# Patient Record
Sex: FEMALE | Race: OTHER | HISPANIC OR LATINO | URBAN - METROPOLITAN AREA
[De-identification: names, ages, dates, MRNs, and addresses within clinical notes are randomized per-mention and may not be internally consistent; named-entity substitution may affect disease eponyms.]

---

## 2021-11-30 ENCOUNTER — EMERGENCY (EMERGENCY)
Facility: HOSPITAL | Age: 41
LOS: 1 days | Discharge: HOME | End: 2021-11-30
Attending: EMERGENCY MEDICINE
Payer: MEDICAID

## 2021-11-30 VITALS
OXYGEN SATURATION: 100 % | TEMPERATURE: 98 F | DIASTOLIC BLOOD PRESSURE: 73 MMHG | HEART RATE: 63 BPM | SYSTOLIC BLOOD PRESSURE: 110 MMHG | RESPIRATION RATE: 18 BRPM

## 2021-11-30 VITALS
SYSTOLIC BLOOD PRESSURE: 130 MMHG | WEIGHT: 151.9 LBS | HEIGHT: 61 IN | HEART RATE: 83 BPM | RESPIRATION RATE: 18 BRPM | TEMPERATURE: 98 F | DIASTOLIC BLOOD PRESSURE: 82 MMHG | OXYGEN SATURATION: 100 %

## 2021-11-30 PROCEDURE — 99283 EMERGENCY DEPT VISIT LOW MDM: CPT | Mod: 25

## 2021-11-30 PROCEDURE — 99284 EMERGENCY DEPT VISIT MOD MDM: CPT

## 2021-11-30 PROCEDURE — 96372 THER/PROPH/DIAG INJ SC/IM: CPT

## 2021-11-30 RX ORDER — KETOROLAC TROMETHAMINE 30 MG/ML
30 SYRINGE (ML) INJECTION ONCE
Refills: 0 | Status: DISCONTINUED | OUTPATIENT
Start: 2021-11-30 | End: 2021-11-30

## 2021-11-30 RX ORDER — LIDOCAINE 4 G/100G
1 CREAM TOPICAL ONCE
Refills: 0 | Status: COMPLETED | OUTPATIENT
Start: 2021-11-30 | End: 2021-11-30

## 2021-11-30 RX ORDER — METHOCARBAMOL 500 MG/1
750 TABLET, FILM COATED ORAL ONCE
Refills: 0 | Status: COMPLETED | OUTPATIENT
Start: 2021-11-30 | End: 2021-11-30

## 2021-11-30 RX ORDER — METHOCARBAMOL 500 MG/1
1 TABLET, FILM COATED ORAL
Qty: 12 | Refills: 0
Start: 2021-11-30

## 2021-11-30 RX ADMIN — Medication 30 MILLIGRAM(S): at 11:00

## 2021-11-30 RX ADMIN — Medication 30 MILLIGRAM(S): at 10:38

## 2021-11-30 RX ADMIN — METHOCARBAMOL 750 MILLIGRAM(S): 500 TABLET, FILM COATED ORAL at 10:38

## 2021-11-30 RX ADMIN — LIDOCAINE 1 PATCH: 4 CREAM TOPICAL at 10:38

## 2021-11-30 NOTE — ED PROVIDER NOTE - PATIENT PORTAL LINK FT
You can access the FollowMyHealth Patient Portal offered by [facility] by registering at the following website: [email or web address]. By joining [facility]’s FollowMyHealth portal, you will also be able to view your health information using other applications (apps) compatible with our system.

## 2021-11-30 NOTE — ED PROVIDER NOTE - OBJECTIVE STATEMENT
[place] [id number]  41yoF with h/o gastritis, presents with pain to R low back, buttock, radiating down leg, sharp in character, constant, worsen with position changes. 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 Denies fall or other trauma, urinary symptoms, fever, v/d, weakness or numbness of extrems, and all other symptoms.

## 2021-11-30 NOTE — ED PROVIDER NOTE - NSFOLLOWUPINSTRUCTIONS_ED_ALL_ED_FT
Sandra un seguimiento con mata médico de atención primaria en 1-2 días.  Utilice ibuprofeno o acetaminofén según sea necesario para el dolor y metocarbamol si no es suficiente.  Regrese al departamento de emergencias si tiene un dolor que empeora, síntomas urinarios, debilidad o entumecimiento de las piernas, vómitos, fiebre o cualquier otro síntoma.      Dolor alpa de la parte inferior de la espalda    LO QUE NECESITA SABER:    El dolor alpa de la región lumbar de la espalda es brennon molestia repentina que dura hasta 6 semanas y que dificulta la actividad.    INSTRUCCIONES SOBRE EL [facility]ITAL[facility]:    Regrese a la suzy de emergencias si:  •Usted tiene dolor intenso.  •Usted repentinamente tiene rigidez o siente pesadez en ambos glúteos hacia abajo de ambas piernas.  •Usted tiene entumecimiento o debilidad en brennon pierna o dolor en ambas piernas.  •Usted tiene entumecimiento en el área genital o en la región lumbar.  •Usted no puede controlar mata orina ni bhavna deposiciones intestinales.    Llame a mata médico si:  •Tiene fiebre.  •Usted tiene un dolor por la noche o cuando descansa.  •Mata dolor no mejora con el tratamiento.  •Usted tiene dolor que empeora cuando tose o estornuda.  •Usted siente un estallido o chasquido repentino en mata espalda.  •Usted tiene preguntas o inquietudes acerca de mata condición o cuidado.    Medicamentos:Es posible que usted necesite alguno de los siguientes:  •Los STEPHANIE,robert el ibuprofeno, ayudan a disminuir la inflamación, el dolor y la fiebre. Lalo medicamento está disponible con o sin brennon receta médica. Los STEPHANIE pueden causar sangrado estomacal o problemas renales en ciertas personas. Si usted heriberto un medicamento anticoagulante, siempre pregúntele a mata médico si los STEPHANIE son seguros para usted. Siempre jolene la etiqueta de lalo medicamento y siga las instrucciones.  •Acetaminofénalivia el dolor y baja la fiebre. Está disponible sin receta médica. Pregunte la cantidad y la frecuencia con que debe tomarlos. Siga las indicaciones. Jolene las etiquetas de todos los demás medicamentos que esté usando para saber si también contienen acetaminofén, o pregunte a mata médico o farmacéutico. El acetaminofén puede causar daño en el hígado cuando no se heriberto de forma correcta. No use más de 4 gramos (4000 miligramos) en total de acetaminofeno en un día  •Relajantes muscularesdisminuyen el dolor y relajan los músculos de la parte inferior de la columna.  •Puede administrarsepodrían administrarse. Pregunte al médico cómo debe rusty lalo medicamento de forma almanzar. Algunos medicamentos recetados para el dolor contienen acetaminofén. No tome otros medicamentos que contengan acetaminofén sin consultarlo con mata médico. Demasiado acetaminofeno puede causar daño al hígado. Los medicamentos recetados para el dolor podrían causar estreñimiento. Pregunte a mata médico robert prevenir o tratar estreñimiento.    Cuidados personales:  •Manténgase activolo más que pueda sin causar más dolor. El reposo en cama puede empeorar mata dolor de espalda. Comience con ejercicios ligeros, robert caminar. Evite levantar objetos hasta que ya no tenga dolor. Solicite más información acerca de las actividades físicas o plan de ejercicios que son los adecuados para usted.  •Aplique caloren la espalda de 20 a 30 minutos cada 2 horas por los días que le indiquen. El calor ayuda a disminuir el dolor y los espasmos musculares. Alterne entre el calor y el hielo.  •Aplique hieloen la espalda de 15 a 20 minutos cada hora o robert se le indique. Use brennon compresa de hielo o ponga hielo triturado en brennon bolsa de plástico. Cúbralo con brennon toalla antes de aplicarlo sobre mata piel. El hielo ayuda a evitar daño al tejido y a disminuir la inflamación y el dolor.    Prevenir el dolor alpa de la parte inferior de la espalda:  •Use la mecánica corporal adecuada.?Flexione la cadera y las rodillas cuando vaya a levantar un objeto. No doble la cintura. Utilice los músculos de las piernas mientras levanta mata carga. No use mata espalda. Mantenga el objeto cerca de mata pecho mientras lo levanta. No se tuerza, ni levante cualquier cosa por encima de mata cintura.  ?Cambie mata posición frecuentemente cuando pase mucho tiempo de pie. Descanse un pie sobre brennon caja pequeña o un reposapiés e intercambie con el otro pie frecuentemente.  ?No permanezca sentado por lapsos de tiempo prolongados. Cuando sea necesario hacerlo, siéntese en brennon silla de respaldo recto con los pies apoyados en el suelo. Nunca alcance, jale ni empuje mientras se encuentra sentando.  •Sandra ejercicios que fortalezcan bhavna músculos de la espalda.Entre en calor antes de hacer ejercicio. Consulte con mata médico sobre el mejor plan de ejercicios para usted.  •Mantenga un peso saludable.Pregúntele a mata médico cuál es el peso ideal para usted. Pídale que lo ayude a crear un plan para bajar de peso si tiene sobrepeso.    Acuda a la consulta de control con mata médico según las indicaciones:Regrese a brennon ibis de seguimiento si usted aún tiene dolor después de 1 a 3 semanas de tratamiento. Paul vez necesite acudir con un ortopedista si mata dolor de espalda dura más de 12 semanas. Anote bhavna preguntas para que se acuerde de hacerlas lavon bhavna visitas.

## 2021-11-30 NOTE — ED PROVIDER NOTE - PHYSICAL EXAMINATION
Afebrile, hemodynamically stable, saturating well  NAD, well appearing, sitting comfortably in bed, no WOB, speaking full sentences  Head NCAT  EOMI grossly, anicteric  MMM  Breathing comfortably on RA  Abd soft, NT, ND, no rebound or guarding  No back deformity/abnormality, no CVAT  +SLR, motor 5/5 and sens symm in b/l LE's  AAO, CN's 3-12 grossly intact  SIERRA spontaneously, no leg cyanosis or edema  Skin warm, well perfused, no rashes or hives

## 2021-11-30 NOTE — ED ADULT NURSE NOTE - OBJECTIVE STATEMENT
Pt c/o Left back pain radiating to the left buttock and leg. No acute distress noted, denies chest pain, no shortness of breath indicated. Pt c/o right lower back pain radiating to the leg. No acute distress noted, denies chest pain, no shortness of breath indicated.

## 2021-11-30 NOTE — ED ADULT NURSE NOTE - NSIMPLEMENTINTERV_GEN_ALL_ED
Implemented All Universal Safety Interventions:  [place] to call system. Call bell, personal items and telephone within reach. Instruct patient to call for assistance. Room bathroom lighting operational. Non-slip footwear when patient is off stretcher. Physically safe environment: no spills, clutter or unnecessary equipment. Stretcher in lowest position, wheels locked, appropriate side rails in place.

## 2021-11-30 NOTE — ED PROVIDER NOTE - CLINICAL SUMMARY MEDICAL DECISION MAKING FREE TEXT BOX
No trauma. No e/o cord compression. No abd pain or urinary symptoms. Ambulating independently. Character c/w herniated disc. Patient is well appearing, NAD, afebrile, hemodynamically stable. Given Toradol, robaxin, lidocaine patch with improvement. Discharged with instructions in further symptomatic care, return precautions, and need for PMD f/u which she states she has established.

## 2023-03-27 ENCOUNTER — EMERGENCY (EMERGENCY)
Facility: HOSPITAL | Age: 43
LOS: 1 days | Discharge: HOME | End: 2023-03-27
Attending: EMERGENCY MEDICINE
Payer: MEDICAID

## 2023-03-27 VITALS
RESPIRATION RATE: 18 BRPM | SYSTOLIC BLOOD PRESSURE: 125 MMHG | HEART RATE: 100 BPM | TEMPERATURE: 100 F | OXYGEN SATURATION: 100 % | DIASTOLIC BLOOD PRESSURE: 81 MMHG | HEIGHT: 60 IN | WEIGHT: 160.06 LBS

## 2023-03-27 VITALS
DIASTOLIC BLOOD PRESSURE: 66 MMHG | HEART RATE: 77 BPM | OXYGEN SATURATION: 97 % | TEMPERATURE: 98 F | SYSTOLIC BLOOD PRESSURE: 104 MMHG | RESPIRATION RATE: 16 BRPM

## 2023-03-27 LAB
ALBUMIN SERPL ELPH-MCNC: 3.4 G/DL — LOW (ref 3.5–5)
ALP SERPL-CCNC: 102 U/L — SIGNIFICANT CHANGE UP (ref 40–120)
ALT FLD-CCNC: 45 U/L DA — SIGNIFICANT CHANGE UP (ref 10–60)
ANION GAP SERPL CALC-SCNC: 6 MMOL/L — SIGNIFICANT CHANGE UP (ref 5–17)
AST SERPL-CCNC: 29 U/L — SIGNIFICANT CHANGE UP (ref 10–40)
BASOPHILS # BLD AUTO: 0.02 K/UL — SIGNIFICANT CHANGE UP (ref 0–0.2)
BASOPHILS NFR BLD AUTO: 0.2 % — SIGNIFICANT CHANGE UP (ref 0–2)
BILIRUB SERPL-MCNC: 0.7 MG/DL — SIGNIFICANT CHANGE UP (ref 0.2–1.2)
BUN SERPL-MCNC: 5 MG/DL — LOW (ref 7–18)
CALCIUM SERPL-MCNC: 9.1 MG/DL — SIGNIFICANT CHANGE UP (ref 8.4–10.5)
CHLORIDE SERPL-SCNC: 109 MMOL/L — HIGH (ref 96–108)
CO2 SERPL-SCNC: 23 MMOL/L — SIGNIFICANT CHANGE UP (ref 22–31)
CREAT SERPL-MCNC: 0.62 MG/DL — SIGNIFICANT CHANGE UP (ref 0.5–1.3)
EGFR: 115 ML/MIN/1.73M2 — SIGNIFICANT CHANGE UP
EOSINOPHIL # BLD AUTO: 0.01 K/UL — SIGNIFICANT CHANGE UP (ref 0–0.5)
EOSINOPHIL NFR BLD AUTO: 0.1 % — SIGNIFICANT CHANGE UP (ref 0–6)
GLUCOSE SERPL-MCNC: 114 MG/DL — HIGH (ref 70–99)
HCG SERPL-ACNC: <1 MIU/ML — SIGNIFICANT CHANGE UP
HCT VFR BLD CALC: 36 % — SIGNIFICANT CHANGE UP (ref 34.5–45)
HGB BLD-MCNC: 12.1 G/DL — SIGNIFICANT CHANGE UP (ref 11.5–15.5)
IMM GRANULOCYTES NFR BLD AUTO: 0.4 % — SIGNIFICANT CHANGE UP (ref 0–0.9)
LYMPHOCYTES # BLD AUTO: 1.28 K/UL — SIGNIFICANT CHANGE UP (ref 1–3.3)
LYMPHOCYTES # BLD AUTO: 10.8 % — LOW (ref 13–44)
MCHC RBC-ENTMCNC: 29.6 PG — SIGNIFICANT CHANGE UP (ref 27–34)
MCHC RBC-ENTMCNC: 33.6 GM/DL — SIGNIFICANT CHANGE UP (ref 32–36)
MCV RBC AUTO: 88 FL — SIGNIFICANT CHANGE UP (ref 80–100)
MONOCYTES # BLD AUTO: 0.53 K/UL — SIGNIFICANT CHANGE UP (ref 0–0.9)
MONOCYTES NFR BLD AUTO: 4.5 % — SIGNIFICANT CHANGE UP (ref 2–14)
NEUTROPHILS # BLD AUTO: 9.92 K/UL — HIGH (ref 1.8–7.4)
NEUTROPHILS NFR BLD AUTO: 84 % — HIGH (ref 43–77)
NRBC # BLD: 0 /100 WBCS — SIGNIFICANT CHANGE UP (ref 0–0)
PLATELET # BLD AUTO: 196 K/UL — SIGNIFICANT CHANGE UP (ref 150–400)
POTASSIUM SERPL-MCNC: 3.9 MMOL/L — SIGNIFICANT CHANGE UP (ref 3.5–5.3)
POTASSIUM SERPL-SCNC: 3.9 MMOL/L — SIGNIFICANT CHANGE UP (ref 3.5–5.3)
PROT SERPL-MCNC: 7.3 G/DL — SIGNIFICANT CHANGE UP (ref 6–8.3)
RBC # BLD: 4.09 M/UL — SIGNIFICANT CHANGE UP (ref 3.8–5.2)
RBC # FLD: 15.1 % — HIGH (ref 10.3–14.5)
SODIUM SERPL-SCNC: 138 MMOL/L — SIGNIFICANT CHANGE UP (ref 135–145)
WBC # BLD: 11.81 K/UL — HIGH (ref 3.8–10.5)
WBC # FLD AUTO: 11.81 K/UL — HIGH (ref 3.8–10.5)

## 2023-03-27 PROCEDURE — 96375 TX/PRO/DX INJ NEW DRUG ADDON: CPT | Mod: XU

## 2023-03-27 PROCEDURE — 70491 CT SOFT TISSUE NECK W/DYE: CPT | Mod: 26,MC

## 2023-03-27 PROCEDURE — 84702 CHORIONIC GONADOTROPIN TEST: CPT

## 2023-03-27 PROCEDURE — 70491 CT SOFT TISSUE NECK W/DYE: CPT | Mod: MC

## 2023-03-27 PROCEDURE — 80053 COMPREHEN METABOLIC PANEL: CPT

## 2023-03-27 PROCEDURE — 99284 EMERGENCY DEPT VISIT MOD MDM: CPT

## 2023-03-27 PROCEDURE — 85025 COMPLETE CBC W/AUTO DIFF WBC: CPT

## 2023-03-27 PROCEDURE — 96374 THER/PROPH/DIAG INJ IV PUSH: CPT | Mod: XU

## 2023-03-27 PROCEDURE — 36415 COLL VENOUS BLD VENIPUNCTURE: CPT

## 2023-03-27 PROCEDURE — 99284 EMERGENCY DEPT VISIT MOD MDM: CPT | Mod: 25

## 2023-03-27 RX ORDER — DEXAMETHASONE 0.5 MG/5ML
6 ELIXIR ORAL ONCE
Refills: 0 | Status: COMPLETED | OUTPATIENT
Start: 2023-03-27 | End: 2023-03-27

## 2023-03-27 RX ORDER — KETOROLAC TROMETHAMINE 30 MG/ML
30 SYRINGE (ML) INJECTION ONCE
Refills: 0 | Status: DISCONTINUED | OUTPATIENT
Start: 2023-03-27 | End: 2023-03-27

## 2023-03-27 RX ADMIN — Medication 6 MILLIGRAM(S): at 09:14

## 2023-03-27 RX ADMIN — Medication 30 MILLIGRAM(S): at 00:00

## 2023-03-27 RX ADMIN — Medication 30 MILLIGRAM(S): at 09:14

## 2023-03-27 NOTE — ED ADULT NURSE NOTE - OBJECTIVE STATEMENT
Patient came to the ED a/o x 3 ambulates c/o throat pain x 2 days. + throat itching, denies any SOB/ difficulty swallowing.

## 2023-03-27 NOTE — ED PROVIDER NOTE - CLINICAL SUMMARY MEDICAL DECISION MAKING FREE TEXT BOX
42-year-old female presents to ED with throat pain for the past 2 days.  Patient afebrile here ED tonsillar exudates appreciated.  Concern for possible retropharyngeal abscess or peritonsillar abscess.  Labs CT neck Toradol Decadron for supportive care and reassess

## 2023-03-27 NOTE — ED PROVIDER NOTE - OBJECTIVE STATEMENT
42-year-old female [place]ian-speaking  #[id number].  Patient with a past medical history of hyperlipidemia.  Patient presents to the ED with throat pain and difficulty swallowing for the last 2 days patient denies fever no cough no chest pain or shortness of breath.  Patient is having difficulty swallowing secondary to throat pain.  No change in voice.  Patient denies similar symptoms previously

## 2023-03-27 NOTE — ED PROVIDER NOTE - PROGRESS NOTE DETAILS
CT shows Asymmetric enlargement right tonsillar pillar with focal submucosal   enhancement and surrounding vasogenic edema although no well-defined   abscess collection.  Pt feels better with toradol and decardon.  Will dc with abx.

## 2023-03-27 NOTE — ED PROVIDER NOTE - PATIENT PORTAL LINK FT
You can access the FollowMyHealth Patient Portal offered by [facility] by registering at the following website: [email or web address]. By joining [facility]’s FollowMyHealth portal, you will also be able to view your health information using other applications (apps) compatible with our system.

## 2023-12-11 ENCOUNTER — EMERGENCY (EMERGENCY)
Facility: HOSPITAL | Age: 43
LOS: 1 days | Discharge: HOME | End: 2023-12-11
Attending: EMERGENCY MEDICINE
Payer: MEDICAID

## 2023-12-11 VITALS
HEART RATE: 67 BPM | TEMPERATURE: 98 F | DIASTOLIC BLOOD PRESSURE: 72 MMHG | RESPIRATION RATE: 16 BRPM | SYSTOLIC BLOOD PRESSURE: 126 MMHG | WEIGHT: 160.06 LBS | HEIGHT: 60 IN | OXYGEN SATURATION: 98 %

## 2023-12-11 PROBLEM — E78.5 HYPERLIPIDEMIA, UNSPECIFIED: Chronic | Status: ACTIVE | Noted: 2023-03-27

## 2023-12-11 LAB
HCG UR QL: NEGATIVE — SIGNIFICANT CHANGE UP
HCG UR QL: NEGATIVE — SIGNIFICANT CHANGE UP

## 2023-12-11 PROCEDURE — 99284 EMERGENCY DEPT VISIT MOD MDM: CPT

## 2023-12-11 PROCEDURE — 81025 URINE PREGNANCY TEST: CPT

## 2023-12-11 PROCEDURE — 96372 THER/PROPH/DIAG INJ SC/IM: CPT

## 2023-12-11 PROCEDURE — 73502 X-RAY EXAM HIP UNI 2-3 VIEWS: CPT

## 2023-12-11 PROCEDURE — 99283 EMERGENCY DEPT VISIT LOW MDM: CPT | Mod: 25

## 2023-12-11 PROCEDURE — 73502 X-RAY EXAM HIP UNI 2-3 VIEWS: CPT | Mod: 26,RT

## 2023-12-11 RX ORDER — CYCLOBENZAPRINE HYDROCHLORIDE 10 MG/1
1 TABLET, FILM COATED ORAL
Qty: 21 | Refills: 0
Start: 2023-12-11 | End: 2023-12-17

## 2023-12-11 RX ORDER — IBUPROFEN 200 MG
1 TABLET ORAL
Qty: 21 | Refills: 0
Start: 2023-12-11 | End: 2023-12-17

## 2023-12-11 RX ORDER — KETOROLAC TROMETHAMINE 30 MG/ML
30 SYRINGE (ML) INJECTION ONCE
Refills: 0 | Status: DISCONTINUED | OUTPATIENT
Start: 2023-12-11 | End: 2023-12-11

## 2023-12-11 RX ORDER — DIAZEPAM 5 MG
5 TABLET ORAL ONCE
Refills: 0 | Status: DISCONTINUED | OUTPATIENT
Start: 2023-12-11 | End: 2023-12-11

## 2023-12-11 RX ADMIN — Medication 5 MILLIGRAM(S): at 17:37

## 2023-12-11 RX ADMIN — Medication 30 MILLIGRAM(S): at 17:37

## 2023-12-11 RX ADMIN — Medication 40 MILLIGRAM(S): at 19:01

## 2023-12-11 RX ADMIN — Medication 30 MILLIGRAM(S): at 18:07

## 2023-12-11 NOTE — ED PROVIDER NOTE - CLINICAL SUMMARY MEDICAL DECISION MAKING FREE TEXT BOX
43 year old female presents with right lateral hip pain. Symptoms may suggest hip bursitis, less likely sciatica, among other causes. Plan to provide NSAIDs, muscle relaxants, do X-Ray of hip, and reassess.

## 2023-12-11 NOTE — ED ADULT NURSE NOTE - NSFALLUNIVINTERV_ED_ALL_ED
Bed/Stretcher in lowest position, wheels locked, appropriate side rails in place/Call bell, personal items and telephone in reach/Instruct patient to call for assistance before getting out of bed/chair/stretcher/Non-slip footwear applied when patient is off stretcher/[place] to call system/Physically safe environment - no spills, clutter or unnecessary equipment/Purposeful proactive rounding/Room/bathroom lighting operational, light cord in reach Bed/Stretcher in lowest position, wheels locked, appropriate side rails in place/Call bell, personal items and telephone in reach/Instruct patient to call for assistance before getting out of bed/chair/stretcher/Non-slip footwear applied when patient is off stretcher/[place] to call system/Physically safe environment - no spills, clutter or unnecessary equipment/Purposeful proactive rounding/Room/bathroom lighting operational, light cord in reach

## 2023-12-11 NOTE — ED PROVIDER NOTE - NSFOLLOWUPINSTRUCTIONS_ED_ALL_ED_FT
Ciática  Sciatica  Back view of a person showing a normal leg and a leg affected by the pain of sciatica.  La ciática es el dolor, entumecimiento, debilidad u hormigueo a lo lyn del nervio ciático. El nervio ciático comienza en la parte inferior de la espalda y desciende por la parte posterior de cada pierna. Controla los músculos en la parte inferior de las piernas y en la parte posterior de las rodillas. También proporciona sensibilidad a la parte posterior de los muslos, la parte inferior de las piernas y la planta de los pies. La ciática es un síntoma de otra afección que ejerce presión o “pellizca” el nervio ciático.    Con mayor frecuencia, la ciática afecta a un solo lado del cuerpo. Suele desaparecer por sí nallely o con tratamiento. En algunos casos, la ciática puede volver (ser recurrente).    ¿Cuáles son las causas?  Esta afección es causada por brennon presión sobre el nervio ciático o “pellizco” del nervio ciático. [place] puede ser el resultado de:  Un disco que sobresale demasiado entre los huesos de la columna vertebral (hernia de disco).  Cambios en los discos vertebrales relacionados con la edad.  Un trastorno doloroso que afecta un músculo de las nalgas.  Un crecimiento óseo adicional cerca del nervio ciático.  Brennon rotura (fractura) de la pelvis.  Embarazo.  Tumor. [place] es poco frecuente.  ¿Qué incrementa el riesgo?  Los siguientes factores pueden hacer que sea más propenso a desarrollar esta afección:  Practicar deportes que ponen presión o tensión sobre la columna vertebral.  Tener poca fuerza y flexibilidad.  Antecedentes de cirugía o lesión en la espalda.  Estar sentado jarad largos períodos de tiempo.  Realizar actividades que requieren agacharse o levantar objetos en forma repetida.  Obesidad.  ¿Cuáles son los signos o síntomas?  Los síntomas pueden variar de leves a muy graves. Pueden incluir los siguientes:  Cualquiera de los siguientes problemas en la parte inferior de la espalda, las piernas, la cadera o las nalgas:  Hormigueo leve, adormecimiento o dolor sordo.  Sensación de ardor.  Dolor alpa.  Adormecimiento de la parte posterior de la pantorrilla o la planta del pie.  Debilidad en las piernas.  Dolor de espalda intenso que dificulta el movimiento.  Los síntomas podrían empeorar al toser, estornudar o reírse, o cuando se está sentado o de pie jarad períodos prolongados.    ¿Cómo se diagnostica?  Esta afección se puede diagnosticar en función de lo siguiente:  Los síntomas y los antecedentes médicos.  Un examen físico.  Pruebas de frankie.  Pruebas de diagnóstico por imágenes, por ejemplo:  Radiografías.  Brennon resonancia magnética (RM).  Brennon exploración por tomografía computarizada (TC).  ¿Cómo se trata?  En muchos casos, esta afección mejora por sí nallely, sin tratamiento. Sin embargo, el tratamiento puede incluir:  Reducción o modificación la actividad física.  Hacer ejercicio, incluido el fortalecimiento y la elongación.  Aplicación de calor o hielo en la glenis afectada.  Medicamentos para lo siguiente:  Aliviar el dolor y la inflamación.  Relajan los músculos.  Medicamentos inyectables que ayudan a aliviar el dolor y la inflamación (corticoesteroides) alrededor del nervio ciático.  Cirugía.  Siga estas instrucciones en mata casa:  Medicamentos    Use los medicamentos de venta kaiser y los recetados solamente robert se lo haya indicado el médico.  Pregúntele al médico si el medicamento recetado le impide conducir o usar maquinaria pesada.  Control del dolor    Bag of ice on a towel on the skin.  A heating pad for use on the affected area.  Si se lo indican, aplique hielo sobre la glenis afectada. Para hacer esto:  Ponga el hielo en brennon bolsa plástica.  Coloque brennon toalla entre la piel y la bolsa.  Aplique el hielo jarad 20 minutos, 2 a 3 veces por día.  Si la piel se le pone de color maxwell brillante, retire el hielo de inmediato para evitar daños en la piel. El riesgo de daño en la piel es mayor si no puede sentir dolor, calor o frío.  Si se lo indican, aplique calor en la glenis afectada con la frecuencia que le haya indicado el médico. Use la luis angel de calor que el médico le recomiende, robert brennon compresa de calor húmedo o brennon almohadilla térmica.  Coloque brennon toalla entre la piel y la luis angel de calor.  Aplique calor jarad 20 a 30 minutos.  Si la piel se le pone de color maxwell brillante, retire el calor de inmediato para evitar quemaduras. El riesgo de quemaduras es mayor si no puede sentir el dolor, el calor o el frío.  Actividad    A comparison showing the right and wrong way to lift a heavy object.  Retome bhavna actividades normales robert se lo haya indicado el médico. Pregúntele al médico qué actividades son seguras para usted.  Evite las actividades que empeoran los síntomas.  Jarad el día, descanse jarad lapsos breves.  Cuando descanse jarad períodos más largos, incorpore alguna actividad suave o ejercicios de elongación entre los períodos de descanso. [place] ayudará a evitar la rigidez y el dolor.  Evite estar sentado jarad largos períodos de tiempo sin moverse. Levántese y muévase al menos brennon vez cada hora.  Sandra ejercicio y elongue con regularidad, robert se lo haya indicado el médico.  No levante objetos que pesen más de 10 libras (4.5 kg) hasta que el médico le diga que es seguro. Aunque no tenga síntomas, evite levantar objetos pesados, en especial en forma repetida.  Siempre use las técnicas de levantamiento correctas para levantar objetos, entre ellas:  Flexionar las rodillas.  Mantener la carga cerca del cuerpo.  No torcerse.  Instrucciones generales    Mantenga un peso saludable. El exceso de peso ejerce tensión adicional sobre la espalda.  Use calzado con buen apoyo y cómodo. Evite usar tacones.  Evite dormir sobre un colchón que sea demasiado blando o demasiado pilo. Un colchón que ofrezca un apoyo suficientemente firme para mata espalda al dormir puede ayudar a aliviar el dolor.  Comuníquese con un médico si:  El dolor no se adonay con los medicamentos.  El dolor no mejora o empeora.  Los síntomas rodriguez más de 4 semanas.  Baja de peso sin causa aparente.  Solicite ayuda de inmediato si:  No puede controlar cuándo orinar o defecar (incontinencia).  Tiene lo siguiente:  Debilidad que empeora en la parte inferior de la espalda, la pelvis, las nalgas o las piernas.  Enrojecimiento o inflamación en la espalda.  Sensación de ardor al orinar.  Resumen  La ciática es dolor, entumecimiento, debilidad u hormigueo a lo lyn del trayecto del nervio ciático, que puede incluir la parte inferior de la espalda, las piernas, las caderas y las nalgas.  Esta afección es causada por brennon presión sobre el nervio ciático o “pellizco” del nervio ciático.  El tratamiento a menudo incluye reposo, ejercicios, medicamentos y aplicar hielo o calor.  Esta información no tiene robert fin reemplazar el consejo del médico. Asegúrese de hacerle al médico cualquier pregunta que tenga.    Document Revised: 04/25/2023 Document Reviewed: 04/25/2023  [employer] Patient Education © 2023 [employer].  [employer] logo  Terms and Conditions  Privacy Policy  Editorial Policy  All content on this site: Copyright © 2023 [employer], its licensors, and contributors. All rights are reserved, including those for text and data mining, AI training, and similar technologies. For all open access content, the Creative Commons licensing terms apply.  Cookies are used by this site. To decline or learn more, visit our Cookies page. Ciática  Sciatica  Back view of a person showing a normal leg and a leg affected by the pain of sciatica.  La ciática es el dolor, entumecimiento, debilidad u hormigueo a lo lyn del nervio ciático. El nervio ciático comienza en la parte inferior de la espalda y desciende por la parte posterior de cada pierna. Controla los músculos en la parte inferior de las piernas y en la parte posterior de las rodillas. También proporciona sensibilidad a la parte posterior de los muslos, la parte inferior de las piernas y la planta de los pies. La ciática es un síntoma de otra afección que ejerce presión o “pellizca” el nervio ciático.    Con mayor frecuencia, la ciática afecta a un solo lado del cuerpo. Suele desaparecer por sí nallely o con tratamiento. En algunos casos, la ciática puede volver (ser recurrente).    ¿Cuáles son las causas?  Esta afección es causada por brennon presión sobre el nervio ciático o “pellizco” del nervio ciático. [place] puede ser el resultado de:  Un disco que sobresale demasiado entre los huesos de la columna vertebral (hernia de disco).  Cambios en los discos vertebrales relacionados con la edad.  Un trastorno doloroso que afecta un músculo de las nalgas.  Un crecimiento óseo adicional cerca del nervio ciático.  Brennon rotura (fractura) de la pelvis.  Embarazo.  Tumor. [place] es poco frecuente.  ¿Qué incrementa el riesgo?  Los siguientes factores pueden hacer que sea más propenso a desarrollar esta afección:  Practicar deportes que ponen presión o tensión sobre la columna vertebral.  Tener poca fuerza y flexibilidad.  Antecedentes de cirugía o lesión en la espalda.  Estar sentado jarad largos períodos de tiempo.  Realizar actividades que requieren agacharse o levantar objetos en forma repetida.  Obesidad.  ¿Cuáles son los signos o síntomas?  Los síntomas pueden variar de leves a muy graves. Pueden incluir los siguientes:  Cualquiera de los siguientes problemas en la parte inferior de la espalda, las piernas, la cadera o las nalgas:  Hormigueo leve, adormecimiento o dolor sordo.  Sensación de ardor.  Dolor alpa.  Adormecimiento de la parte posterior de la pantorrilla o la planta del pie.  Debilidad en las piernas.  Dolor de espalda intenso que dificulta el movimiento.  Los síntomas podrían empeorar al toser, estornudar o reírse, o cuando se está sentado o de pie jarad períodos prolongados.    ¿Cómo se diagnostica?  Esta afección se puede diagnosticar en función de lo siguiente:  Los síntomas y los antecedentes médicos.  Un examen físico.  Pruebas de frankie.  Pruebas de diagnóstico por imágenes, por ejemplo:  Radiografías.  Brennon resonancia magnética (RM).  Brennon exploración por tomografía computarizada (TC).  ¿Cómo se trata?  En muchos casos, esta afección mejora por sí nallely, sin tratamiento. Sin embargo, el tratamiento puede incluir:  Reducción o modificación la actividad física.  Hacer ejercicio, incluido el fortalecimiento y la elongación.  Aplicación de calor o hielo en la glenis afectada.  Medicamentos para lo siguiente:  Aliviar el dolor y la inflamación.  Relajan los músculos.  Medicamentos inyectables que ayudan a aliviar el dolor y la inflamación (corticoesteroides) alrededor del nervio ciático.  Cirugía.  Siga estas instrucciones en mata casa:  Medicamentos    Use los medicamentos de venta kaiser y los recetados solamente robert se lo haya indicado el médico.  Pregúntele al médico si el medicamento recetado le impide conducir o usar maquinaria pesada.  Control del dolor    Bag of ice on a towel on the skin.  A heating pad for use on the affected area.  Si se lo indican, aplique hielo sobre la glenis afectada. Para hacer esto:  Ponga el hielo en brennon bolsa plástica.  Coloque brennon toalla entre la piel y la bolsa.  Aplique el hielo jarad 20 minutos, 2 a 3 veces por día.  Si la piel se le pone de color maxwell brillante, retire el hielo de inmediato para evitar daños en la piel. El riesgo de daño en la piel es mayor si no puede sentir dolor, calor o frío.  Si se lo indican, aplique calor en la glenis afectada con la frecuencia que le haya indicado el médico. Use la luis angel de calor que el médico le recomiende, robert brennon compresa de calor húmedo o brennon almohadilla térmica.  Coloque brennon toalla entre la piel y la luis angel de calor.  Aplique calor jarad 20 a 30 minutos.  Si la piel se le pone de color maxwell brillante, retire el calor de inmediato para evitar quemaduras. El riesgo de quemaduras es mayor si no puede sentir el dolor, el calor o el frío.  Actividad    A comparison showing the right and wrong way to lift a heavy object.  Retome bhavna actividades normales robert se lo haya indicado el médico. Pregúntele al médico qué actividades son seguras para usted.  Evite las actividades que empeoran los síntomas.  Jarad el día, descanse jarad lapsos breves.  Cuando descanse ajrad períodos más largos, incorpore alguna actividad suave o ejercicios de elongación entre los períodos de descanso. [place] ayudará a evitar la rigidez y el dolor.  Evite estar sentado jarad largos períodos de tiempo sin moverse. Levántese y muévase al menos brennon vez cada hora.  Sandra ejercicio y elongue con regularidad, robert se lo haya indicado el médico.  No levante objetos que pesen más de 10 libras (4.5 kg) hasta que el médico le diga que es seguro. Aunque no tenga síntomas, evite levantar objetos pesados, en especial en forma repetida.  Siempre use las técnicas de levantamiento correctas para levantar objetos, entre ellas:  Flexionar las rodillas.  Mantener la carga cerca del cuerpo.  No torcerse.  Instrucciones generales    Mantenga un peso saludable. El exceso de peso ejerce tensión adicional sobre la espalda.  Use calzado con buen apoyo y cómodo. Evite usar tacones.  Evite dormir sobre un colchón que sea demasiado blando o demasiado pilo. Un colchón que ofrezca un apoyo suficientemente firme para mata espalda al dormir puede ayudar a aliviar el dolor.  Comuníquese con un médico si:  El dolor no se adonay con los medicamentos.  El dolor no mejora o empeora.  Los síntomas rodriguez más de 4 semanas.  Baja de peso sin causa aparente.  Solicite ayuda de inmediato si:  No puede controlar cuándo orinar o defecar (incontinencia).  Tiene lo siguiente:  Debilidad que empeora en la parte inferior de la espalda, la pelvis, las nalgas o las piernas.  Enrojecimiento o inflamación en la espalda.  Sensación de ardor al orinar.  Resumen  La ciática es dolor, entumecimiento, debilidad u hormigueo a lo lyn del trayecto del nervio ciático, que puede incluir la parte inferior de la espalda, las piernas, las caderas y las nalgas.  Esta afección es causada por brennon presión sobre el nervio ciático o “pellizco” del nervio ciático.  El tratamiento a menudo incluye reposo, ejercicios, medicamentos y aplicar hielo o calor.  Esta información no tiene robert fin reemplazar el consejo del médico. Asegúrese de hacerle al médico cualquier pregunta que tenga.    Document Revised: 04/25/2023 Document Reviewed: 04/25/2023  [employer] Patient Education © 2023 [employer].  "[employer] logo  Terms and Conditions  Privacy Policy  Editorial Policy  All content on this site: Copyright © 2023 "[employer].", its licensors, and contributors. All rights are reserved, including those for text and data mining, AI training, and similar technologies. For all open access content, the Creative Commons licensing terms apply.  Cookies are used by this site. To decline or learn more, visit our Cookies page.

## 2023-12-11 NOTE — ED PROVIDER NOTE - OBJECTIVE STATEMENT
43 year old female with sciatica (on gabapentin) presents to ED complaining of worsening pain to right hip, radiating down to the ankle over the past week. Patient reports receiving injection to her back for chronic back pain, the last injection on 12/10 to some relief of pain. States that her leg pain had a flare-up today while she was at work. Endorses pain worse when seated or when she goes up steps. Did not take any meds for symptoms. Denies weakness, numbness, or bowel or bladder symptoms.  [id number] Merlin used. NKDA. 43 year old female with sciatica (on gabapentin) presents to ED complaining of worsening pain to right hip, radiating down to the ankle over the past week. Patient reports receiving injection to her back for chronic back pain, the last injection on 12/10 to some relief of pain. States that her leg pain had a flare-up today while she was at work. Endorses pain worse when seated or when she goes up steps. Did not take any meds for symptoms. Denies weakness, numbness, or bowel or bladder symptoms.  [id number] Merlin used. NKDA.

## 2023-12-11 NOTE — ED PROVIDER NOTE - PROGRESS NOTE DETAILS
Collette: feels improved. xray negative. likely sciatica. will dc. rx for meds f/u with PMD. return precautions discussed.

## 2023-12-11 NOTE — ED PROVIDER NOTE - PATIENT PORTAL LINK FT
You can access the FollowMyHealth Patient Portal offered by [facility] by registering at the following website: [email or web address]. By joining [employer]’s FollowMyHealth portal, you will also be able to view your health information using other applications (apps) compatible with our system. You can access the FollowMyHealth Patient Portal offered by [facility] by registering at the following website: [email or web address]. By joining [employer]’s FollowMyHealth portal, you will also be able to view your health information using other applications (apps) compatible with our system.

## 2023-12-11 NOTE — ED ADULT NURSE NOTE - OBJECTIVE STATEMENT
Pt presented to ED c/o Rt lower back pain radiating to RLE   Has chronic back pain /sciatica, had and injection done yesterday by PMD

## 2024-02-09 ENCOUNTER — EMERGENCY (EMERGENCY)
Facility: HOSPITAL | Age: 44
LOS: 1 days | Discharge: HOME | End: 2024-02-09
Attending: STUDENT IN AN ORGANIZED HEALTH CARE EDUCATION/TRAINING PROGRAM
Payer: COMMERCIAL

## 2024-02-09 VITALS
TEMPERATURE: 98 F | HEART RATE: 60 BPM | SYSTOLIC BLOOD PRESSURE: 110 MMHG | RESPIRATION RATE: 16 BRPM | DIASTOLIC BLOOD PRESSURE: 74 MMHG | OXYGEN SATURATION: 100 %

## 2024-02-09 VITALS
TEMPERATURE: 98 F | DIASTOLIC BLOOD PRESSURE: 86 MMHG | OXYGEN SATURATION: 99 % | WEIGHT: 149.91 LBS | HEIGHT: 60 IN | HEART RATE: 74 BPM | SYSTOLIC BLOOD PRESSURE: 109 MMHG | RESPIRATION RATE: 16 BRPM

## 2024-02-09 PROBLEM — M54.30 SCIATICA, UNSPECIFIED SIDE: Chronic | Status: ACTIVE | Noted: 2023-12-11

## 2024-02-09 LAB
ALBUMIN SERPL ELPH-MCNC: 3.6 G/DL — SIGNIFICANT CHANGE UP (ref 3.5–5)
ALP SERPL-CCNC: 71 U/L — SIGNIFICANT CHANGE UP (ref 40–120)
ALT FLD-CCNC: 19 U/L DA — SIGNIFICANT CHANGE UP (ref 10–60)
ANION GAP SERPL CALC-SCNC: 8 MMOL/L — SIGNIFICANT CHANGE UP (ref 5–17)
AST SERPL-CCNC: 8 U/L — LOW (ref 10–40)
BASOPHILS # BLD AUTO: 0.03 K/UL — SIGNIFICANT CHANGE UP (ref 0–0.2)
BASOPHILS NFR BLD AUTO: 0.3 % — SIGNIFICANT CHANGE UP (ref 0–2)
BILIRUB SERPL-MCNC: 0.5 MG/DL — SIGNIFICANT CHANGE UP (ref 0.2–1.2)
BLD GP AB SCN SERPL QL: SIGNIFICANT CHANGE UP
BUN SERPL-MCNC: 8 MG/DL — SIGNIFICANT CHANGE UP (ref 7–18)
CALCIUM SERPL-MCNC: 8.5 MG/DL — SIGNIFICANT CHANGE UP (ref 8.4–10.5)
CHLORIDE SERPL-SCNC: 108 MMOL/L — SIGNIFICANT CHANGE UP (ref 96–108)
CO2 SERPL-SCNC: 22 MMOL/L — SIGNIFICANT CHANGE UP (ref 22–31)
CREAT SERPL-MCNC: 0.6 MG/DL — SIGNIFICANT CHANGE UP (ref 0.5–1.3)
D DIMER BLD IA.RAPID-MCNC: 225 NG/ML DDU — SIGNIFICANT CHANGE UP
EGFR: 115 ML/MIN/1.73M2 — SIGNIFICANT CHANGE UP
EOSINOPHIL # BLD AUTO: 0.02 K/UL — SIGNIFICANT CHANGE UP (ref 0–0.5)
EOSINOPHIL NFR BLD AUTO: 0.2 % — SIGNIFICANT CHANGE UP (ref 0–6)
GLUCOSE SERPL-MCNC: 104 MG/DL — HIGH (ref 70–99)
HCG SERPL-ACNC: <1 MIU/ML — SIGNIFICANT CHANGE UP
HCT VFR BLD CALC: 28 % — LOW (ref 34.5–45)
HGB BLD-MCNC: 8 G/DL — LOW (ref 11.5–15.5)
IMM GRANULOCYTES NFR BLD AUTO: 0.3 % — SIGNIFICANT CHANGE UP (ref 0–0.9)
LYMPHOCYTES # BLD AUTO: 1.73 K/UL — SIGNIFICANT CHANGE UP (ref 1–3.3)
LYMPHOCYTES # BLD AUTO: 18.1 % — SIGNIFICANT CHANGE UP (ref 13–44)
MCHC RBC-ENTMCNC: 22 PG — LOW (ref 27–34)
MCHC RBC-ENTMCNC: 28.6 GM/DL — LOW (ref 32–36)
MCV RBC AUTO: 77.1 FL — LOW (ref 80–100)
MONOCYTES # BLD AUTO: 0.45 K/UL — SIGNIFICANT CHANGE UP (ref 0–0.9)
MONOCYTES NFR BLD AUTO: 4.7 % — SIGNIFICANT CHANGE UP (ref 2–14)
NEUTROPHILS # BLD AUTO: 7.31 K/UL — SIGNIFICANT CHANGE UP (ref 1.8–7.4)
NEUTROPHILS NFR BLD AUTO: 76.4 % — SIGNIFICANT CHANGE UP (ref 43–77)
NRBC # BLD: 0 /100 WBCS — SIGNIFICANT CHANGE UP (ref 0–0)
PLATELET # BLD AUTO: 285 K/UL — SIGNIFICANT CHANGE UP (ref 150–400)
POTASSIUM SERPL-MCNC: 4.2 MMOL/L — SIGNIFICANT CHANGE UP (ref 3.5–5.3)
POTASSIUM SERPL-SCNC: 4.2 MMOL/L — SIGNIFICANT CHANGE UP (ref 3.5–5.3)
PROT SERPL-MCNC: 7 G/DL — SIGNIFICANT CHANGE UP (ref 6–8.3)
RBC # BLD: 3.63 M/UL — LOW (ref 3.8–5.2)
RBC # FLD: 17.7 % — HIGH (ref 10.3–14.5)
SODIUM SERPL-SCNC: 138 MMOL/L — SIGNIFICANT CHANGE UP (ref 135–145)
TROPONIN I, HIGH SENSITIVITY RESULT: 3 NG/L — SIGNIFICANT CHANGE UP
WBC # BLD: 9.57 K/UL — SIGNIFICANT CHANGE UP (ref 3.8–10.5)
WBC # FLD AUTO: 9.57 K/UL — SIGNIFICANT CHANGE UP (ref 3.8–10.5)

## 2024-02-09 PROCEDURE — 82962 GLUCOSE BLOOD TEST: CPT

## 2024-02-09 PROCEDURE — 86900 BLOOD TYPING SEROLOGIC ABO: CPT

## 2024-02-09 PROCEDURE — 96375 TX/PRO/DX INJ NEW DRUG ADDON: CPT

## 2024-02-09 PROCEDURE — 99285 EMERGENCY DEPT VISIT HI MDM: CPT

## 2024-02-09 PROCEDURE — 93005 ELECTROCARDIOGRAM TRACING: CPT

## 2024-02-09 PROCEDURE — 85379 FIBRIN DEGRADATION QUANT: CPT

## 2024-02-09 PROCEDURE — 36415 COLL VENOUS BLD VENIPUNCTURE: CPT

## 2024-02-09 PROCEDURE — 84702 CHORIONIC GONADOTROPIN TEST: CPT

## 2024-02-09 PROCEDURE — 96374 THER/PROPH/DIAG INJ IV PUSH: CPT

## 2024-02-09 PROCEDURE — 80053 COMPREHEN METABOLIC PANEL: CPT

## 2024-02-09 PROCEDURE — 86850 RBC ANTIBODY SCREEN: CPT

## 2024-02-09 PROCEDURE — 93010 ELECTROCARDIOGRAM REPORT: CPT

## 2024-02-09 PROCEDURE — 86901 BLOOD TYPING SEROLOGIC RH(D): CPT

## 2024-02-09 PROCEDURE — 85025 COMPLETE CBC W/AUTO DIFF WBC: CPT

## 2024-02-09 PROCEDURE — 84484 ASSAY OF TROPONIN QUANT: CPT

## 2024-02-09 PROCEDURE — 99284 EMERGENCY DEPT VISIT MOD MDM: CPT | Mod: 25

## 2024-02-09 RX ORDER — ACETAMINOPHEN 500 MG
650 TABLET ORAL ONCE
Refills: 0 | Status: COMPLETED | OUTPATIENT
Start: 2024-02-09 | End: 2024-02-09

## 2024-02-09 RX ORDER — SODIUM CHLORIDE 9 MG/ML
1000 INJECTION INTRAMUSCULAR; INTRAVENOUS; SUBCUTANEOUS ONCE
Refills: 0 | Status: COMPLETED | OUTPATIENT
Start: 2024-02-09 | End: 2024-02-09

## 2024-02-09 RX ORDER — METOCLOPRAMIDE HCL 10 MG
10 TABLET ORAL ONCE
Refills: 0 | Status: COMPLETED | OUTPATIENT
Start: 2024-02-09 | End: 2024-02-09

## 2024-02-09 RX ORDER — KETOROLAC TROMETHAMINE 30 MG/ML
15 SYRINGE (ML) INJECTION ONCE
Refills: 0 | Status: DISCONTINUED | OUTPATIENT
Start: 2024-02-09 | End: 2024-02-09

## 2024-02-09 RX ADMIN — Medication 10 MILLIGRAM(S): at 11:54

## 2024-02-09 RX ADMIN — Medication 15 MILLIGRAM(S): at 11:54

## 2024-02-09 RX ADMIN — Medication 650 MILLIGRAM(S): at 11:54

## 2024-02-09 RX ADMIN — SODIUM CHLORIDE 1000 MILLILITER(S): 9 INJECTION INTRAMUSCULAR; INTRAVENOUS; SUBCUTANEOUS at 11:55

## 2024-02-09 NOTE — ED PROVIDER NOTE - PROGRESS NOTE DETAILS
Patient feeling improved after receiving IVF.  Hemoglobin 8.  All labs within normal limits.  Will discharge.

## 2024-02-09 NOTE — ED ADULT TRIAGE NOTE - CHIEF COMPLAINT QUOTE
patient reports her doctor told her to go to the hospital because she got anemia. patient complaining of dizziness with NV x yesterday no cp normal Female

## 2024-02-09 NOTE — ED PROVIDER NOTE - CLINICAL SUMMARY MEDICAL DECISION MAKING FREE TEXT BOX
43-year-old female, no past medical history, currently menstruating presenting with chief complaint of lightheadedness.   Vital signs stable, patient very well-appearing.  No tachycardia or hypotension to suggest significant blood loss.  EKG normal sinus rhythm, HR 72, no signs of ischemia, normal intervals.   , , QTc 451.   Will check repeat blood work  to evaluate for progression of anemia.  Given reports of shortness of breath, will also check D-dimer to screen for pulmonary embolism and troponin, although some shortness of breath most likely secondary to anemia.   Will check chest x-ray to eval for possible pneumonia/pneumothorax/effusion.   Patient also endorsing headache, will give IV fluids, Reglan, Tylenol and Toradol and reassess.

## 2024-02-09 NOTE — ED PROVIDER NOTE - PHYSICAL EXAMINATION
Gen: NAD  Head: NCAT  Eyes: EOMI, PERRLA, no conjunctival pallor, no scleral icterus  ENT: mucous membranes moist, no discharge  Neck: neck supple  Resp: CTAB, no W/R/R  CV: RRR, +S1/S2, no M/R/G  GI: Abdomen soft non-distended, NTTP, no masses  Skin: no rash or bruising

## 2024-02-09 NOTE — ED ADULT NURSE NOTE - CHIEF COMPLAINT QUOTE
patient reports her doctor told her to go to the hospital because she got anemia. patient complaining of dizziness with NV x yesterday no cp

## 2024-02-09 NOTE — ED PROVIDER NOTE - OBJECTIVE STATEMENT
43-year-old female, no past medical history, currently menstruating presenting with chief complaint of lightheadedness.  Patient states that yesterday, she saw her primary care doctor which she had blood work drawn for complaints of dizziness associated with some nausea and vomiting.  Patient was found to have hemoglobin 7.8 drawn yesterday 2/8/2024, and told to go to the ER by primary care doctor.  Patient states that she is currently menstruating, started period today.  denies other sources of bleeding, denies any melena or dark stools.   Denies any chest pain, does endorse some shortness of breath.   Also endorses chronic right lower extremity pain for the past couple months.   does endorse nausea and 1 episode of vomiting this morning that was nonbloody nonbilious.  No fevers and no chills.   Patient does endorse history of small fibroids.  does endorse headache

## 2024-02-09 NOTE — ED ADULT NURSE NOTE - NSFALLUNIVINTERV_ED_ALL_ED
Bed/Stretcher in lowest position, wheels locked, appropriate side rails in place/Call bell, personal items and telephone in reach/Instruct patient to call for assistance before getting out of bed/chair/stretcher/Non-slip footwear applied when patient is off stretcher/[place] to call system/Physically safe environment - no spills, clutter or unnecessary equipment/Purposeful proactive rounding/Room/bathroom lighting operational, light cord in reach

## 2024-02-09 NOTE — ED ADULT NURSE NOTE - OBJECTIVE STATEMENT
pt presents with dizziness after seeing pmd yesterday and having hgb of 7.8. pt endorses vaginal bleeding. At baseline pt is AAOx4,speaking in clear and complete sentences. She is self-ambulatory to and from bathroom

## 2024-02-09 NOTE — ED PROVIDER NOTE - NSFOLLOWUPINSTRUCTIONS_ED_ALL_ED_FT
– Regrese si hay algún empeoramiento o síntomas preocupantes (che más abajo).  – Seguimiento con médico de atención primaria en los próximos 5 a 7 javier.    Mareo    LO QUE NECESITAS SABER:  ¿Qué es el mareo? El mareo es brennon sensación de falta de equilibrio o inestabilidad. Las causas comunes de mareos son un desequilibrio de líquido en el oído interno o brennon falta de oxígeno en la frankie. El mareo puede ser alpa (dura 3 días o menos) o crónico (dura más de 3 días). Es posible que tenga mareos que aakash desde segundos hasta algunas horas.    ¿Qué aumenta mi riesgo de sufrir mareos? Los mareos pueden empeorar lavon ciertas actividades o cuando se mueve de cierta manera. Lo siguiente también puede aumentar mata riesgo de sufrir mareos:  • Edad avanzada  •Brennon infección, cirugía de oído o brennon afección del oído interno, robert la enfermedad de [place].  •Apoplejía, tumor cerebral o traumatismo craneoencefálico reciente.  •Brennon lesión que causa brennon gran pérdida de frankie.  •Problemas cardíacos o de presión arterial  •Exposición a productos químicos o consumo prolongado de alcohol.  •Medicamentos utilizados para tratar la presión arterial will, trastornos convulsivos o ansiedad y depresión.  •Un trastorno nervioso, robert la esclerosis múltiple    ¿Qué signos y síntomas pueden ocurrir con los mareos?  • Brennon sensación de que mata entorno se está moviendo aunque usted esté quieto.  •Zumbidos en los oídos o pérdida de audición  • Sensación de desmayo o aturdimiento  •Debilidad o inestabilidad  •Visión doble o movimientos oculares que no puedes controlar.  •Náuseas o vómitos  •Confusión    ¿Cómo se diagnostica la causa de los mareos? Mata proveedor de atención médica puede preguntarle cuándo comenzó el mareo. Informe al proveedor si tiene mareos y cuánto rodriguez. Dígale lo que sucede antes de que se jazzy. El proveedor le preguntará si tiene otras afecciones de jasmeet y si heriberto algún medicamento. Él o magdalena controlará mata presión arterial y mata pulso para che si rebecca mareos pueden estar relacionados con mata corazón. También es posible que revisen mata equilibrio, fuerza, reflejos y mata forma de caminar. Es posible que necesite cualquiera de las siguientes pruebas para ayudar a encontrar la causa de rebecca mareos:  •Un electrocardiograma registra la actividad eléctrica de mata corazón. Se puede utilizar un electrocardiograma para detectar latidos cardíacos anormales o daño cardíaco.  •Los análisis de frankie controlarán mata nivel de azúcar en frankie, infecciones y niveles de células sanguíneas.  •Las imágenes por tomografía computarizada o resonancia magnética detectan un derrame cerebral, brennon lesión en la pedro luis o un tumor cerebral. También verifican si hay sangrado o hinchazón cerebral. Es posible que le administren un líquido de contraste para ayudar a que mata cerebro se solomon mejor en las imágenes. Informe a mata proveedor de atención médica si alguna vez ha tenido brennon reacción alérgica al líquido de contraste. No entre a la suzy de resonancia magnética con nada metálico. El metal puede causar lesiones graves. Informe al proveedor de atención médica si tiene algún metal dentro o sobre mata cuerpo.    ¿Cómo se tratan los mareos? El tratamiento dependerá de la causa de rebecca mareos. Mata proveedor de atención médica puede darle oxígeno o medicamentos para disminuir los mareos y las náuseas. También puede derivarlo a un especialista. Es posible que deba ser admitido en el hospital para recibir tratamiento.    ¿Cómo puedo controlar mis síntomas?  •No conduzca ni opere maquinaria pesada cuando esté mareado.  •Levántese lentamente después de estar sentado o acostado.  •Yee muchos líquidos. Los líquidos ayudan a prevenir la deshidratación. Pregunte cuánto líquido debe beber cada día y qué líquidos son mejores para usted.    ¿Cuándo demetria buscar atención inmediata?  •Tiene dolor de pedro luis y rigidez en el dewayne.  •Tiene escalofríos y fiebre.  •Vomitas brennon y otra vez sin alivio.  •Mata vómito o deposiciones son rojos o negros.  •Tiene dolor en el pecho, la espalda o el abdomen.  •Tiene entumecimiento, especialmente en la roman, los brazos o las piernas.  •Tiene problemas para  los brazos o las piernas.  •Estás confundido.  ¿Cuándo demetria comunicarme con mi proveedor de atención médica?  •Tienes fiebre.  •Rebecca síntomas no mejoran con el tratamiento.  •Tiene preguntas o inquietudes sobre mata condición o atención.    CONTRATO DE CUIDADO:  Tiene derecho a ayudar a planificar mata atención. Conozca mata condición de jasmeet y cómo puede tratarse. Analice las opciones de tratamiento con rebecca proveedores de atención médica para decidir qué atención desea recibir. Siempre tienes derecho a rechazar el tratamiento.

## 2024-02-09 NOTE — ED PROVIDER NOTE - PATIENT PORTAL LINK FT
You can access the FollowMyHealth Patient Portal offered by [facility] by registering at the following website: [email or web address]. By joining [employer]’s FollowMyHealth portal, you will also be able to view your health information using other applications (apps) compatible with our system.

## 2024-05-24 NOTE — ED ADULT TRIAGE NOTE - HEART RATE (BEATS/MIN)
Group Topic: BH Activity Group    Date: 5/24/2024  Start Time:  2:00 PM  End Time:  2:50 PM  Facilitators: Deja Alves LCSW    Focus: Values (Problem Solving Skills)  Number in attendance: 9    Patient identified their 3 values and rated them from a scale from 1 to 3. Patient then wrote down for each value, why that value is important to them and how they can honor it.    Deja Alves LCSW    Method: Group   Attendance: Present  Participation: Active  Patient Response: Appropriate feedback and Attentive    Pt presented engaged and alert in group discussion. Pt participated in group activity. Pt identified compassion, loyalty, and compassion as their top three values.          68

## 2025-07-31 NOTE — ED PROVIDER NOTE - NS ED MD DISPO DISCHARGE
Last GFR, Sodium, and Potassium completed on 07/23/24: >90, 138, and 3.5.     Next labs scheduled for 07/28/25 - pt did not complete   Next appt: 08/04/25 annual.     Okay to refill?   Home